# Patient Record
Sex: MALE | Race: WHITE | Employment: FULL TIME | URBAN - METROPOLITAN AREA
[De-identification: names, ages, dates, MRNs, and addresses within clinical notes are randomized per-mention and may not be internally consistent; named-entity substitution may affect disease eponyms.]

---

## 2022-10-01 ENCOUNTER — APPOINTMENT (OUTPATIENT)
Dept: GENERAL RADIOLOGY | Age: 37
End: 2022-10-01
Payer: OTHER MISCELLANEOUS

## 2022-10-01 ENCOUNTER — HOSPITAL ENCOUNTER (EMERGENCY)
Age: 37
Discharge: HOME | End: 2022-10-01
Payer: OTHER MISCELLANEOUS

## 2022-10-01 VITALS
DIASTOLIC BLOOD PRESSURE: 69 MMHG | OXYGEN SATURATION: 100 % | RESPIRATION RATE: 16 BRPM | BODY MASS INDEX: 27.71 KG/M2 | SYSTOLIC BLOOD PRESSURE: 129 MMHG | TEMPERATURE: 98.7 F | WEIGHT: 210 LBS | HEART RATE: 68 BPM

## 2022-10-01 DIAGNOSIS — S16.1XXA STRAIN OF NECK MUSCLE, INITIAL ENCOUNTER: ICD-10-CM
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
## 2022-10-01 PROCEDURE — 72050 X-RAY EXAM NECK SPINE 4/5VWS: CPT

## 2022-10-01 PROCEDURE — 99211 OFF/OP EST MAY X REQ PHY/QHP: CPT

## 2022-10-01 RX ORDER — METHOCARBAMOL 500 MG/1
500 TABLET, FILM COATED ORAL 4 TIMES DAILY PRN
Qty: 30 TABLET | Refills: 0 | Status: SHIPPED | OUTPATIENT
Start: 2022-10-01 | End: 2022-10-11

## 2022-10-01 RX ORDER — LIDOCAINE 50 MG/G
1 PATCH TOPICAL DAILY
Qty: 10 PATCH | Refills: 0 | Status: SHIPPED | OUTPATIENT
Start: 2022-10-01 | End: 2022-10-11

## 2022-10-01 ASSESSMENT — PAIN - FUNCTIONAL ASSESSMENT
PAIN_FUNCTIONAL_ASSESSMENT: NONE - DENIES PAIN
PAIN_FUNCTIONAL_ASSESSMENT: NONE - DENIES PAIN
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
## 2022-10-01 NOTE — Clinical Note
Mel Rich was seen and treated in our emergency department on 10/1/2022. He may return to work on 10/04/2022. If you have any questions or concerns, please don't hesitate to call.       LOGAN Sapp

## 2023-03-10 ENCOUNTER — APPOINTMENT (OUTPATIENT)
Dept: GENERAL RADIOLOGY | Age: 38
End: 2023-03-10
Payer: COMMERCIAL

## 2023-03-10 ENCOUNTER — HOSPITAL ENCOUNTER (EMERGENCY)
Age: 38
Discharge: HOME | End: 2023-03-10
Payer: COMMERCIAL

## 2023-03-10 VITALS
RESPIRATION RATE: 20 BRPM | BODY MASS INDEX: 28.49 KG/M2 | HEART RATE: 97 BPM | WEIGHT: 215 LBS | DIASTOLIC BLOOD PRESSURE: 78 MMHG | OXYGEN SATURATION: 96 % | SYSTOLIC BLOOD PRESSURE: 133 MMHG | HEIGHT: 73 IN | TEMPERATURE: 98.7 F

## 2023-03-10 DIAGNOSIS — M25.552 LEFT HIP PAIN: Primary | ICD-10-CM

## 2023-03-10 DIAGNOSIS — M54.50 ACUTE LEFT-SIDED LOW BACK PAIN WITHOUT SCIATICA: ICD-10-CM

## 2023-03-10 PROCEDURE — 99211 OFF/OP EST MAY X REQ PHY/QHP: CPT

## 2023-03-10 PROCEDURE — 72100 X-RAY EXAM L-S SPINE 2/3 VWS: CPT

## 2023-03-10 RX ORDER — LIDOCAINE 50 MG/G
1 PATCH TOPICAL DAILY
Qty: 10 PATCH | Refills: 0 | Status: SHIPPED | OUTPATIENT
Start: 2023-03-10 | End: 2023-03-20

## 2023-03-10 RX ORDER — METHOCARBAMOL 500 MG/1
500 TABLET, FILM COATED ORAL 4 TIMES DAILY PRN
Qty: 30 TABLET | Refills: 0 | Status: SHIPPED | OUTPATIENT
Start: 2023-03-10 | End: 2023-03-20

## 2023-03-10 RX ORDER — PREDNISONE 10 MG/1
TABLET ORAL
Qty: 30 TABLET | Refills: 0 | Status: SHIPPED | OUTPATIENT
Start: 2023-03-10 | End: 2023-03-20

## 2023-03-10 ASSESSMENT — PAIN DESCRIPTION - LOCATION: LOCATION: HIP

## 2023-03-10 ASSESSMENT — PAIN SCALES - GENERAL: PAINLEVEL_OUTOF10: 10

## 2023-03-10 ASSESSMENT — PAIN DESCRIPTION - DESCRIPTORS: DESCRIPTORS: SHARP;TENDER

## 2023-03-10 ASSESSMENT — PAIN DESCRIPTION - ORIENTATION: ORIENTATION: LEFT

## 2023-03-10 ASSESSMENT — PAIN - FUNCTIONAL ASSESSMENT: PAIN_FUNCTIONAL_ASSESSMENT: 0-10

## 2023-03-10 NOTE — Clinical Note
Bev Torres was seen and treated in our emergency department on 3/10/2023. He may return to work on 03/14/2023. May return sooner should symptoms improve. If you have any questions or concerns, please don't hesitate to call.       LOGAN Remy
last six months
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
## 2023-10-09 PROBLEM — R06.83 SNORING: Status: ACTIVE | Noted: 2023-10-09

## 2023-10-09 PROBLEM — N50.819 PAIN IN TESTICLE: Status: ACTIVE | Noted: 2023-10-09

## 2023-10-09 PROBLEM — R05.9 COUGH: Status: ACTIVE | Noted: 2023-10-09

## 2023-10-09 PROBLEM — A74.9 CHLAMYDIA: Status: ACTIVE | Noted: 2023-10-09

## 2023-10-09 PROBLEM — N50.0 ATROPHIC TESTICLE: Status: ACTIVE | Noted: 2023-10-09

## 2023-10-09 PROBLEM — R36.9 PENILE DISCHARGE: Status: ACTIVE | Noted: 2023-10-09

## 2023-10-09 PROBLEM — R10.9 ABDOMINAL PAIN: Status: ACTIVE | Noted: 2023-10-09

## 2023-10-09 PROBLEM — R79.89 LOW SERUM TESTOSTERONE LEVEL: Status: ACTIVE | Noted: 2023-10-09

## 2023-10-09 PROBLEM — E55.9 VITAMIN D DEFICIENCY: Status: ACTIVE | Noted: 2023-10-09

## 2023-10-09 PROBLEM — A59.9 TRICHIMONIASIS: Status: ACTIVE | Noted: 2023-10-09

## 2023-10-09 PROBLEM — R30.0 BURNING WITH URINATION: Status: ACTIVE | Noted: 2023-10-09

## 2023-10-09 PROBLEM — S61.219A LACERATION OF FINGER OF LEFT HAND WITHOUT FOREIGN BODY WITHOUT DAMAGE TO NAIL: Status: ACTIVE | Noted: 2023-10-09

## 2023-10-09 PROBLEM — B96.89 ACUTE BACTERIAL SINUSITIS: Status: ACTIVE | Noted: 2023-10-09

## 2023-10-09 PROBLEM — M54.2 NECK PAIN: Status: ACTIVE | Noted: 2023-10-09

## 2023-10-09 PROBLEM — M25.552 HIP PAIN, BILATERAL: Status: ACTIVE | Noted: 2023-10-09

## 2023-10-09 PROBLEM — E34.9 TESTOSTERONE DEFICIENCY: Status: ACTIVE | Noted: 2023-10-09

## 2023-10-09 PROBLEM — L02.91 ABSCESS: Status: ACTIVE | Noted: 2023-10-09

## 2023-10-09 PROBLEM — J01.90 ACUTE BACTERIAL SINUSITIS: Status: ACTIVE | Noted: 2023-10-09

## 2023-10-09 PROBLEM — N39.0 ACUTE UTI: Status: ACTIVE | Noted: 2023-10-09

## 2023-10-09 PROBLEM — G47.19 EXCESSIVE DAYTIME SLEEPINESS: Status: ACTIVE | Noted: 2023-10-09

## 2023-10-09 PROBLEM — N46.01 AZOOSPERMIA: Status: ACTIVE | Noted: 2023-10-09

## 2023-10-09 PROBLEM — N46.9 INFERTILITY MALE: Status: ACTIVE | Noted: 2023-10-09

## 2023-10-09 PROBLEM — N45.1 EPIDIDYMITIS: Status: ACTIVE | Noted: 2023-10-09

## 2023-10-09 PROBLEM — E29.1 HYPOGONADISM MALE: Status: ACTIVE | Noted: 2023-10-09

## 2023-10-09 PROBLEM — B34.9 VIRAL ILLNESS: Status: ACTIVE | Noted: 2023-10-09

## 2023-10-09 PROBLEM — M25.551 HIP PAIN, BILATERAL: Status: ACTIVE | Noted: 2023-10-09

## 2023-10-09 PROBLEM — S61.219S FINGER LACERATION, SEQUELA: Status: ACTIVE | Noted: 2023-10-09

## 2023-10-09 PROBLEM — G56.22 ENTRAPMENT OF LEFT ULNAR NERVE: Status: ACTIVE | Noted: 2023-10-09

## 2023-10-09 PROBLEM — A60.00 GENITAL HERPES: Status: ACTIVE | Noted: 2023-10-09

## 2023-10-09 PROBLEM — N52.9 ERECTILE DYSFUNCTION: Status: ACTIVE | Noted: 2023-10-09

## 2023-10-09 PROBLEM — A64 SEXUALLY TRANSMISSIBLE DISEASE: Status: ACTIVE | Noted: 2023-10-09

## 2023-10-09 PROBLEM — G47.00 INSOMNIA: Status: ACTIVE | Noted: 2023-10-09

## 2023-10-09 PROBLEM — R05.3 COUGH, PERSISTENT: Status: ACTIVE | Noted: 2023-10-09

## 2024-02-01 ENCOUNTER — LAB (OUTPATIENT)
Dept: LAB | Facility: LAB | Age: 39
End: 2024-02-01
Payer: COMMERCIAL

## 2024-02-01 DIAGNOSIS — Z01.818 ENCOUNTER FOR OTHER PREPROCEDURAL EXAMINATION: Primary | ICD-10-CM

## 2024-02-01 LAB
ANION GAP SERPL CALC-SCNC: 12 MMOL/L (ref 10–20)
BASOPHILS # BLD AUTO: 0.11 X10*3/UL (ref 0–0.1)
BASOPHILS NFR BLD AUTO: 1.7 %
BUN SERPL-MCNC: 15 MG/DL (ref 6–23)
CALCIUM SERPL-MCNC: 9.6 MG/DL (ref 8.6–10.6)
CHLORIDE SERPL-SCNC: 107 MMOL/L (ref 98–107)
CO2 SERPL-SCNC: 29 MMOL/L (ref 21–32)
CREAT SERPL-MCNC: 0.77 MG/DL (ref 0.5–1.3)
EGFRCR SERPLBLD CKD-EPI 2021: >90 ML/MIN/1.73M*2
EOSINOPHIL # BLD AUTO: 0.31 X10*3/UL (ref 0–0.7)
EOSINOPHIL NFR BLD AUTO: 4.8 %
ERYTHROCYTE [DISTWIDTH] IN BLOOD BY AUTOMATED COUNT: 13.2 % (ref 11.5–14.5)
GLUCOSE SERPL-MCNC: 77 MG/DL (ref 74–99)
HCT VFR BLD AUTO: 46.9 % (ref 41–52)
HGB BLD-MCNC: 15.7 G/DL (ref 13.5–17.5)
IMM GRANULOCYTES # BLD AUTO: 0.02 X10*3/UL (ref 0–0.7)
IMM GRANULOCYTES NFR BLD AUTO: 0.3 % (ref 0–0.9)
LYMPHOCYTES # BLD AUTO: 1.68 X10*3/UL (ref 1.2–4.8)
LYMPHOCYTES NFR BLD AUTO: 25.9 %
MCH RBC QN AUTO: 31.5 PG (ref 26–34)
MCHC RBC AUTO-ENTMCNC: 33.5 G/DL (ref 32–36)
MCV RBC AUTO: 94 FL (ref 80–100)
MONOCYTES # BLD AUTO: 0.63 X10*3/UL (ref 0.1–1)
MONOCYTES NFR BLD AUTO: 9.7 %
NEUTROPHILS # BLD AUTO: 3.73 X10*3/UL (ref 1.2–7.7)
NEUTROPHILS NFR BLD AUTO: 57.6 %
NRBC BLD-RTO: 0 /100 WBCS (ref 0–0)
PLATELET # BLD AUTO: 208 X10*3/UL (ref 150–450)
POTASSIUM SERPL-SCNC: 3.9 MMOL/L (ref 3.5–5.3)
RBC # BLD AUTO: 4.98 X10*6/UL (ref 4.5–5.9)
SODIUM SERPL-SCNC: 144 MMOL/L (ref 136–145)
WBC # BLD AUTO: 6.5 X10*3/UL (ref 4.4–11.3)

## 2024-02-01 PROCEDURE — 80048 BASIC METABOLIC PNL TOTAL CA: CPT

## 2024-02-01 PROCEDURE — 85025 COMPLETE CBC W/AUTO DIFF WBC: CPT

## 2024-02-01 PROCEDURE — 36415 COLL VENOUS BLD VENIPUNCTURE: CPT

## 2024-03-07 ENCOUNTER — TELEMEDICINE (OUTPATIENT)
Dept: PRIMARY CARE | Facility: CLINIC | Age: 39
End: 2024-03-07
Payer: COMMERCIAL

## 2024-03-07 DIAGNOSIS — J06.9 VIRAL URI: Primary | ICD-10-CM

## 2024-03-07 PROCEDURE — 99213 OFFICE O/P EST LOW 20 MIN: CPT | Performed by: NURSE PRACTITIONER

## 2024-03-07 ASSESSMENT — ENCOUNTER SYMPTOMS
FEVER: 1
FATIGUE: 1
COUGH: 1
CARDIOVASCULAR NEGATIVE: 1
WEAKNESS: 0
DIZZINESS: 0
GASTROINTESTINAL NEGATIVE: 1

## 2024-03-07 NOTE — PROGRESS NOTES
"  Hay Mcdaniels is a 38 y.o. male who presents for presents for evaluation of . Symptoms include . Onset of symptoms was {0-10:07861}  ago, {course:17::\"unchanged\"} since that time. Assoc{URI rfv:91096::\"symptoms of a URI\"}iated symptoms include {uri sx:55619}. He {hydration history:86907}. Evaluation to date: {uri eval:81489::\"none\"}. Treatment to date: {uri tx:66338::\"none\"}    There were no vitals taken for this visit.    A review of systems was completed, pertinent systems are normal except as noted in HPI.    Physical Exam       {uri dx:5273::\"viral upper respiratory illness\"}    {uri tx plan:25859::\"Discussed diagnosis and treatment of URI.\",\"Suggested symptomatic OTC remedies.\",\"Nasal saline spray for congestion.\",\"Follow up as needed.\"}  Reviewed typical course versus sx that require re-evaluation.     Sudden or acute changes to go to ED.      Assessment/Plan   Problem List Items Addressed This Visit    None         "

## 2024-03-07 NOTE — PROGRESS NOTES
Subjective   Patient ID: Hay Mcdaniels is a 38 y.o. male who presents for URI  Began 2 days ago, yesterday in earnest.  T 100-101, myalgias, some nasal, cough  Tested for COVID day 1-2, not detected.  Malaise and fatigue prominent.  Has oxycontin from recent knee surgery.  Wife with same symptoms.        Review of Systems   Constitutional:  Positive for fatigue and fever.   HENT:  Positive for congestion.    Respiratory:  Positive for cough.    Cardiovascular: Negative.    Gastrointestinal: Negative.    Neurological:  Negative for dizziness and weakness.       Objective   Physical Exam  Constitutional:       General: He is not in acute distress.     Appearance: He is ill-appearing. He is not toxic-appearing.   HENT:      Nose: Congestion present.   Pulmonary:      Effort: Pulmonary effort is normal.   Musculoskeletal:      Cervical back: Neck supple.   Neurological:      Mental Status: He is alert and oriented to person, place, and time.     Resp  unlabored  Neck supple.  Neuro unremarkable  Some nasal congestion evident    Assessment/Plan   Diagnoses and all orders for this visit:  Viral URI           MECCA Romero-CNP 03/07/24 10:45 AM

## 2024-03-12 ENCOUNTER — TELEMEDICINE (OUTPATIENT)
Dept: PRIMARY CARE | Facility: CLINIC | Age: 39
End: 2024-03-12
Payer: COMMERCIAL

## 2024-03-12 DIAGNOSIS — R06.2 WHEEZING: Primary | ICD-10-CM

## 2024-03-12 PROCEDURE — 99212 OFFICE O/P EST SF 10 MIN: CPT | Performed by: FAMILY MEDICINE

## 2024-03-12 RX ORDER — ALBUTEROL SULFATE 90 UG/1
2 AEROSOL, METERED RESPIRATORY (INHALATION) EVERY 4 HOURS PRN
Qty: 6.7 G | Refills: 0 | Status: SHIPPED | OUTPATIENT
Start: 2024-03-12 | End: 2025-03-12

## 2024-03-12 NOTE — PROGRESS NOTES
I performed this visit using realtime telehealth tools, including an audio/video OR telephone connection between the patient listed who was located in the [facility] and myself, Sandra Garcia (Board certified in the [facility]).  At the start of the visit, I introduced myself as Dr. Mtz and verified the patients name, , and current physical location.    If they were currently outside of the state of [place], the visit was ended and the patient was referred to alternative means for evaluation and treatment.   The patient was made aware of the limitations of the telehealth visit.  They will not be physically examined and all issues may not be appropriate for a telehealth visit.  If necessary, an in person referral will be made.      All allergies were reviewed as well as reconciliation of all medications.      Chief Complaint: URI sxs  HPI: had a virtual a few days ago--has the flu he thinks  Requesting albuterol  No h/o asthma  Sudden onset of fever, chill, aches. Cough, congestion, ST, overwhelming fatigue and diarrhea  Diarrhea recurred today  Chest is heavy  Has used albuterol in the past and it helped  No SOB with movement  Quit smoking-1 week ago-- had smoked 20 years--   Nighttime chest is heavy-- wakes up wheezy  Cough is dry    ALL OTHER ROS (-)    General appearance:  Vitals available from patient?No  Alert, oriented, pleasant, in no apparent distress Yes  Answers questions appropriatelyYes  Eyes clear?Yes  Is patient in respiratory distressNo    Throat exam Not Available  Is patient coughing during consult:Yes  Audible wheezing noted? :No  Pt sounds congested?: No  Sniffing or rhinorrhea?: No  Psychiatric: Affect normalYes  Other relevant physical exam:    Pt location in [place] and consent obtained. Telemedicine appropriate evaluation completed. Appropriate physical exam done.    Influenza--resolving  Now with wheezing-- has responded in past to albuterol-   Albuterol as written-- if aerochamber  not covered, discussed other options he can use at home  Follow up if worsens or persists        CDC updated Respiratory Infection guidelines:   When you have a respiratory virus, stay home and away from others (including people  you live with who are not sick) Symptoms can include fever, chills, fatigue, cough,  runny nose, and headache (and others).    You can go back to your normal activities when,   for at least 24 hours,   BOTH are true:    1) Your symptoms are getting better overall  2) You have not had a fever (and are not using fever-reducing medication).    When you go back to your normal activities, take added precaution over the next 5 days, such as taking additional steps for  air, hygiene, masks, physical distancing, and/or testing when you will be around other people indoors.    Keep in mind that you may still be able to spread the virus that made you sick, even if you are feeling better. You are likely to be less contagious at this time, depending on factors like how long you were sick or how sick you were.    If you develop a fever or you start to feel worse after you have gone back to normal activities, stay home and away from others again until, for at least 24 hours, both are true: your symptoms are improving overall, and you have not had a fever (and are not using fever-reducing medication). Then take added precaution for the next 5 days.    If you never had symptoms but tested positive for a respiratory virus?, you may be contagious. For the next 5 days: take added precaution, such as taking additional steps for  air, hygiene, masks, physical distancing, and/or testing when you will be around other people indoors. This is especially important to protect people with factors that increase their risk of severe illness from respiratory viruses.  Avoid immunocompromised, elderly, pregnant women, infants etc    Have a low threshold for in person evaluation if your symptoms worsen.

## 2024-03-12 NOTE — PATIENT INSTRUCTIONS
Please send me a [facility] message if you have any questions or concerns.  FOR NON URGENT questions only.  Allow up to 72 hours for response.    If you have prescription issues or other questions you can email   Sudheer Barrett  Digital Health Coordinator, at   [email or web address]     Influenza--resolving  Now with wheezing-- has responded in past to albuterol-   Albuterol as written-- if aerochamber not covered, discussed other options he can use at home  Follow up if worsens or persists    Rest and drink plenty of fluids    Tylenol and or motrin as needed for pain and fever (unless you have been told not to take these because of your personal medical history)    Discussed options and precautions (complaint specific and may include)  Viral versus bacterial infection; use of medications; possible side effects; appropriate over-the-counter medications; possible complications and /or when to follow-up.    Follow-up in 1 to 2 days if not improving.  Follow-up immediately if symptoms worsen.    All red flags requiring in person care were discussed.  All patient's questions were answered.    To connect with a new PCP please visit [email or web address] or call [phone]     If experiencing any severe or worsening symptoms including but not limited to lethargy / chest pain / weakness / dizziness / difficulty breathing please call 911 or go to the emergency department for immediate care!    Limitations to telemedicine include inability to do a complete and accurate physical exam.  Any concerns regarding this were conveyed with the patient and in person follow-up recommended if patient nature of illness does not progress as anticipated during this visit.    Over-the-counter cold and cough medications    Take Mucinex for cough, drink plenty of fluids with this medication and will help break up congestion making it easier to cough up    For cough can use honey (children ages 1 and  up) in hot tea or hot water. I recommend putting this in an insulated cup and carrying it around throughout the day to sip on.  Have it at your bedside at night in case you wake up coughing.  You can also use honey cough drops (adults and older children).    Recommend nasal saline for use in children and adults.  Neti Rodriguez can also be helpful.  (Never used tap water and a Neti pot.  Use distilled water.)    If you have plugged up congested ears or the feeling of fluid in your ears, you can use an over-the-counter nasal steroid spray like fluticasone (brand name Flonase) use 2 sprays into each nostril once or twice a day for 7 days.  This will help open up the eustachian tubes and allow the fluid to drain out of your ears.    Sleeping with your head/chest elevated can help with sinus drainage.    Adults only-can use nasal decongestant (like Afrin) at bedtime to open nasal passages so you can breathe through your nose while you sleep; avoid using for longer than 3 days as this medication can become addicting.  Do not use if you have high blood pressure or high heart rate.    For severe pain or fever in adult-Tylenol (2 extra strength) or ibuprofen (3-4 tabs equals 600 to 800 mg) alternating as needed for pain.  Tylenol doses should be 6 to 8 hours apart and ibuprofen doses should be 6 to 8 hours apart.      Common cold medications for adults explained:    **Cold medications for children are not recommended-only Tylenol, Motrin, honey (only for children older than 1 year), cool-mist humidifier, and nasal saline spray are recommended for children.    Mucinex-(generic name guaifenesin)-is an expectorant.  This will thin out all the thick mucus.  Must drink plenty of liquids for this medicine to work.    Dextromethorphan (brand name Delsym or DM)-this medicine is a cough suppressant--caution/avoid use if taking SSRI medication because of risk of Serotonin Syndrome    Honey in hot water or tea-this is a natural cough  suppressant    Decongestant nasal spray- (eg: Afrin) use for temporary relief of nasal congestion-best when used at bedtime to open up nasal passages so that you are not forced to mouth breathe overnight.    Sudafed (generic name pseudoephedrine-this must be bought from the pharmacist) DO NOT use this medicine if you have high blood pressure as it can raise your blood pressure higher.  Do not use if you have any irregular heart rate.  This medicine can help clear congestion in your sinuses.

## 2024-04-10 NOTE — ASSESSMENT & PLAN NOTE
Hx and limited exam c/w viral URI, suspect flu/COVID -reviewed typical course - usually worst day 3-4, supportive care.  Fluids, mucolytic, Tylenol, more fluids  Reviewed symptoms that would prompt an in person follow up.  Defers on flu testing.  Stop smoking